# Patient Record
Sex: FEMALE | Race: BLACK OR AFRICAN AMERICAN | Employment: FULL TIME | URBAN - METROPOLITAN AREA
[De-identification: names, ages, dates, MRNs, and addresses within clinical notes are randomized per-mention and may not be internally consistent; named-entity substitution may affect disease eponyms.]

---

## 2019-06-04 ENCOUNTER — OFFICE VISIT (OUTPATIENT)
Dept: INTERNAL MEDICINE CLINIC | Age: 29
End: 2019-06-04

## 2019-06-04 ENCOUNTER — HOSPITAL ENCOUNTER (OUTPATIENT)
Dept: LAB | Age: 29
Discharge: HOME | End: 2019-06-04
Payer: COMMERCIAL

## 2019-06-04 VITALS
WEIGHT: 140 LBS | HEART RATE: 82 BPM | SYSTOLIC BLOOD PRESSURE: 107 MMHG | HEIGHT: 63 IN | RESPIRATION RATE: 18 BRPM | BODY MASS INDEX: 24.8 KG/M2 | OXYGEN SATURATION: 98 % | TEMPERATURE: 98.8 F | DIASTOLIC BLOOD PRESSURE: 66 MMHG

## 2019-06-04 DIAGNOSIS — L65.9 ALOPECIA: ICD-10-CM

## 2019-06-04 DIAGNOSIS — Z00.00 ENCOUNTER FOR PREVENTIVE HEALTH EXAMINATION: ICD-10-CM

## 2019-06-04 DIAGNOSIS — J30.1 SEASONAL ALLERGIC RHINITIS DUE TO POLLEN: ICD-10-CM

## 2019-06-04 DIAGNOSIS — B35.1 ONYCHOMYCOSIS: ICD-10-CM

## 2019-06-04 DIAGNOSIS — N92.0 MENORRHAGIA WITH REGULAR CYCLE: ICD-10-CM

## 2019-06-04 DIAGNOSIS — Z00.00 ENCOUNTER FOR PREVENTIVE HEALTH EXAMINATION: Primary | ICD-10-CM

## 2019-06-04 DIAGNOSIS — D50.0 IRON DEFICIENCY ANEMIA DUE TO CHRONIC BLOOD LOSS: ICD-10-CM

## 2019-06-04 LAB
ALBUMIN SERPL-MCNC: 3.7 G/DL (ref 3.4–5)
ALBUMIN/GLOB SERPL: 1.1 {RATIO} (ref 0.8–1.7)
ALP SERPL-CCNC: 62 U/L (ref 45–117)
ALT SERPL-CCNC: 21 U/L (ref 13–56)
ANION GAP SERPL CALC-SCNC: 7 MMOL/L (ref 3–18)
APPEARANCE UR: CLEAR
AST SERPL-CCNC: 18 U/L (ref 15–37)
BACTERIA URNS QL MICRO: NEGATIVE /HPF
BASOPHILS # BLD: 0 K/UL (ref 0–0.1)
BASOPHILS NFR BLD: 0 % (ref 0–2)
BILIRUB SERPL-MCNC: 1.1 MG/DL (ref 0.2–1)
BILIRUB UR QL: NEGATIVE
BUN SERPL-MCNC: 11 MG/DL (ref 7–18)
BUN/CREAT SERPL: 16 (ref 12–20)
CALCIUM SERPL-MCNC: 8.3 MG/DL (ref 8.5–10.1)
CHLORIDE SERPL-SCNC: 106 MMOL/L (ref 100–108)
CO2 SERPL-SCNC: 26 MMOL/L (ref 21–32)
COLOR UR: YELLOW
CREAT SERPL-MCNC: 0.67 MG/DL (ref 0.6–1.3)
DIFFERENTIAL METHOD BLD: ABNORMAL
EOSINOPHIL # BLD: 0.1 K/UL (ref 0–0.4)
EOSINOPHIL NFR BLD: 1 % (ref 0–5)
EPITH CASTS URNS QL MICRO: ABNORMAL /LPF (ref 0–5)
ERYTHROCYTE [DISTWIDTH] IN BLOOD BY AUTOMATED COUNT: 13 % (ref 11.6–14.5)
GLOBULIN SER CALC-MCNC: 3.5 G/DL (ref 2–4)
GLUCOSE SERPL-MCNC: 72 MG/DL (ref 74–99)
GLUCOSE UR STRIP.AUTO-MCNC: NEGATIVE MG/DL
HCT VFR BLD AUTO: 37 % (ref 35–45)
HGB BLD-MCNC: 11.9 G/DL (ref 12–16)
HGB UR QL STRIP: NEGATIVE
KETONES UR QL STRIP.AUTO: NEGATIVE MG/DL
LEUKOCYTE ESTERASE UR QL STRIP.AUTO: ABNORMAL
LYMPHOCYTES # BLD: 3.2 K/UL (ref 0.9–3.6)
LYMPHOCYTES NFR BLD: 46 % (ref 21–52)
MCH RBC QN AUTO: 28.7 PG (ref 24–34)
MCHC RBC AUTO-ENTMCNC: 32.2 G/DL (ref 31–37)
MCV RBC AUTO: 89.2 FL (ref 74–97)
MONOCYTES # BLD: 0.5 K/UL (ref 0.05–1.2)
MONOCYTES NFR BLD: 7 % (ref 3–10)
NEUTS SEG # BLD: 3.2 K/UL (ref 1.8–8)
NEUTS SEG NFR BLD: 46 % (ref 40–73)
NITRITE UR QL STRIP.AUTO: NEGATIVE
PH UR STRIP: 6.5 [PH] (ref 5–8)
PLATELET # BLD AUTO: 278 K/UL (ref 135–420)
PMV BLD AUTO: 11 FL (ref 9.2–11.8)
POTASSIUM SERPL-SCNC: 3.9 MMOL/L (ref 3.5–5.5)
PROT SERPL-MCNC: 7.2 G/DL (ref 6.4–8.2)
PROT UR STRIP-MCNC: NEGATIVE MG/DL
RBC # BLD AUTO: 4.15 M/UL (ref 4.2–5.3)
RBC #/AREA URNS HPF: 0 /HPF (ref 0–5)
SODIUM SERPL-SCNC: 139 MMOL/L (ref 136–145)
SP GR UR REFRACTOMETRY: 1.02 (ref 1–1.03)
TSH SERPL DL<=0.05 MIU/L-ACNC: 0.89 UIU/ML (ref 0.36–3.74)
UROBILINOGEN UR QL STRIP.AUTO: 1 EU/DL (ref 0.2–1)
VIT B12 SERPL-MCNC: 362 PG/ML (ref 211–911)
WBC # BLD AUTO: 6.9 K/UL (ref 4.6–13.2)
WBC URNS QL MICRO: ABNORMAL /HPF (ref 0–4)

## 2019-06-04 PROCEDURE — 36415 COLL VENOUS BLD VENIPUNCTURE: CPT

## 2019-06-04 PROCEDURE — 80053 COMPREHEN METABOLIC PANEL: CPT

## 2019-06-04 PROCEDURE — 86038 ANTINUCLEAR ANTIBODIES: CPT

## 2019-06-04 PROCEDURE — 82607 VITAMIN B-12: CPT

## 2019-06-04 PROCEDURE — 81001 URINALYSIS AUTO W/SCOPE: CPT

## 2019-06-04 PROCEDURE — 85025 COMPLETE CBC W/AUTO DIFF WBC: CPT

## 2019-06-04 PROCEDURE — 84443 ASSAY THYROID STIM HORMONE: CPT

## 2019-06-04 RX ORDER — ACETAMINOPHEN AND CODEINE PHOSPHATE 120; 12 MG/5ML; MG/5ML
SOLUTION ORAL
COMMUNITY
End: 2019-06-04 | Stop reason: SDUPTHER

## 2019-06-04 RX ORDER — ACETAMINOPHEN AND CODEINE PHOSPHATE 120; 12 MG/5ML; MG/5ML
1 SOLUTION ORAL DAILY
Qty: 3 DOSE PACK | Refills: 3 | Status: SHIPPED | OUTPATIENT
Start: 2019-06-04 | End: 2019-07-31

## 2019-06-04 NOTE — PROGRESS NOTES
Chief Complaint   Patient presents with    Physical     1. Have you been to the ER, urgent care clinic since your last visit? Hospitalized since your last visit? No    2. Have you seen or consulted any other health care providers outside of the [address] [place] [address] since your last visit? Include any pap smears or colon screening.  No

## 2019-06-04 NOTE — PATIENT INSTRUCTIONS
Assess overall health status (check labs)    Dermatology:  Onychomycosis    Alopecia>>Check B12, KRISTY (Lupus) and Thyroid    Anemia>>check CBC and iron level    Heavy periods>>OCP prescribed    Tight Hamstrings:   Stretch

## 2019-06-04 NOTE — PROGRESS NOTES
HPI:     This is the daughter of Geovanna Porter who presents to the office for wellness visit. She did not bring any records for review. She is currently working at the [employer] but is trying to sign up for the [employer]. She is under stress as she has three children at home. She and her  have one car and she has to get up at 0300 to drive him to work then she has to get home to get the children prepared for school. She is usually exhausted by the end of the day. She reports being treated for onychomycosis with what sounds like Lamisil but she did not get the desired effect and wanted to be referred out. She has been seen by a dermatologist in [place] for what sounds like cradle cap and provided Clobetasol solution with improvement. She also has noticed thinning of her hair and wanted to know what can be done to reverse this. She has history JAUN but is not taking supplements. She has menorrhagia and has been on OCP to control this. She denies any history of fibroids. She had one pregnancy delivered via C/S because of fetal deceleration with 2 others delivered transvaginally. She denied any problems with pre-eclampsia, gestational diabetes or other complications. History reviewed. No pertinent past medical history. Past Surgical History:   Procedure Laterality Date    HX  SECTION      x 1    HX GYN       Current Outpatient Medications   Medication Sig    norethindrone (MICRONOR) 0.35 mg tab Take 1 Tab by mouth daily. No current facility-administered medications for this visit. Allergies and Intolerances: Allergies   Allergen Reactions    Sulfadiazine Hives     Family History:   Family History   Problem Relation Age of Onset    Diabetes Mother     Hypertension Mother     No Known Problems Father     No Known Problems Sister     No Known Problems Sister     Endometriosis Sister      Social History:   She  reports that she has never smoked.  She has never used smokeless tobacco.   Social History     Substance and Sexual Activity   Alcohol Use Yes    Comment: occass     Immunization History:      Avoids flu    Diet:                                 Regular        Exercise:                         None    Screeningt:                      GYN 2018    Occupational Risk:          No hazards    Review of Systems   Constitutional: Negative for chills, fever and weight loss. HENT: Negative for hearing loss. Eyes: Negative for blurred vision. Respiratory: Negative for shortness of breath and wheezing. Cardiovascular: Negative for chest pain. Gastrointestinal: Negative for heartburn. Genitourinary: Negative for dysuria and urgency. Musculoskeletal: Positive for back pain. Skin: Negative for rash. Neurological: Negative for dizziness. Endo/Heme/Allergies: Positive for environmental allergies. Psychiatric/Behavioral: Negative for depression. The patient has insomnia. Physical:   Visit Vitals  /66 (BP 1 Location: Left arm, BP Patient Position: Sitting)   Pulse 82   Temp 98.8 °F (37.1 °C) (Oral)   Resp 18   Ht 5' 3\" (1.6 m)   Wt 140 lb (63.5 kg)   LMP 05/03/2019   SpO2 98%   BMI 24.80 kg/m²          Physical Exam   Constitutional: She is well-developed, well-nourished, and in no distress. HENT:   Head: Normocephalic and atraumatic. Right Ear: External ear normal.   Left Ear: External ear normal.   Nose:       Mouth/Throat: Oropharynx is clear and moist.   Eyes: Conjunctivae are normal. No scleral icterus. Neck: No JVD present. Cardiovascular: Normal rate, regular rhythm and normal heart sounds. Exam reveals no gallop. No murmur heard. Pulmonary/Chest: Breath sounds normal. She has no wheezes. She has no rales. Abdominal: Soft. Bowel sounds are normal. She exhibits no mass. There is no tenderness. Musculoskeletal: She exhibits no edema.    Tight hamstrings, negative SLR to 30 degrees, no LS spine tenderness, no peripheral synovitis Lymphadenopathy:     She has no cervical adenopathy. Skin: No cyanosis. Nails show no clubbing. Clear scalp, shaved at side, nail polish obscures nail details, tattoos   Vitals reviewed. Review of Data:  Labs:  No visits with results within 3 Month(s) from this visit. Latest known visit with results is:   No results found for any previous visit.              Health Maintenance Due   Topic Date Due    DTaP/Tdap/Td series (1 - Tdap) 01/08/2012    PAP AKA CERVICAL CYTOLOGY  01/08/2012          Impression/Plan:   Patient Active Problem List   Diagnosis  Code    Encounter for preventive health examination nonfasting labs ordered, confirm immunizations, adequate rest, healthy diet and regular exercise (stretch, walk)   Z00.00    Menorrhagia with regular cycle Referral to Dr. Estuardo Juarez, refill OCP pending appointment with her, repeat CBC   N92.0    Alopecia +/- seborrheic dermatitis, improved Obtain dermatology consultation notes   L65.9    Onychomycosis Referral to dermatology for nail clipping culture   B35.1    Seasonal allergic rhinitis due to pollen Flonase prn   J30.1    Iron deficiency anemia due to chronic blood loss Resume iron replacement, repeat CBC D50.0       Elma Narayanan MD

## 2019-06-06 DIAGNOSIS — Z00.00 ENCOUNTER FOR PREVENTIVE HEALTH EXAMINATION: Primary | ICD-10-CM

## 2019-06-06 LAB — ANA TITR SER IF: NEGATIVE {TITER}

## 2019-07-31 ENCOUNTER — APPOINTMENT (OUTPATIENT)
Dept: ULTRASOUND IMAGING | Age: 29
End: 2019-07-31
Attending: PHYSICIAN ASSISTANT
Payer: COMMERCIAL

## 2019-07-31 ENCOUNTER — HOSPITAL ENCOUNTER (EMERGENCY)
Age: 29
Discharge: HOME | End: 2019-08-01
Attending: EMERGENCY MEDICINE
Payer: COMMERCIAL

## 2019-07-31 VITALS
SYSTOLIC BLOOD PRESSURE: 111 MMHG | WEIGHT: 140 LBS | DIASTOLIC BLOOD PRESSURE: 69 MMHG | HEIGHT: 63 IN | TEMPERATURE: 99 F | OXYGEN SATURATION: 99 % | BODY MASS INDEX: 24.8 KG/M2 | RESPIRATION RATE: 16 BRPM | HEART RATE: 84 BPM

## 2019-07-31 DIAGNOSIS — Z32.01 PREGNANCY TEST POSITIVE: Primary | ICD-10-CM

## 2019-07-31 DIAGNOSIS — O23.41 URINARY TRACT INFECTION IN MOTHER DURING FIRST TRIMESTER OF PREGNANCY: ICD-10-CM

## 2019-07-31 LAB
ALBUMIN SERPL-MCNC: 3.4 G/DL (ref 3.4–5)
ALBUMIN/GLOB SERPL: 0.9 {RATIO} (ref 0.8–1.7)
ALP SERPL-CCNC: 53 U/L (ref 45–117)
ALT SERPL-CCNC: 19 U/L (ref 13–56)
ANION GAP SERPL CALC-SCNC: 6 MMOL/L (ref 3–18)
APPEARANCE UR: ABNORMAL
AST SERPL-CCNC: 16 U/L (ref 10–38)
BACTERIA URNS QL MICRO: ABNORMAL /HPF
BASOPHILS # BLD: 0 K/UL (ref 0–0.1)
BASOPHILS NFR BLD: 0 % (ref 0–2)
BILIRUB SERPL-MCNC: 0.5 MG/DL (ref 0.2–1)
BILIRUB UR QL: NEGATIVE
BUN SERPL-MCNC: 12 MG/DL (ref 7–18)
BUN/CREAT SERPL: 15 (ref 12–20)
CALCIUM SERPL-MCNC: 8.6 MG/DL (ref 8.5–10.1)
CHLORIDE SERPL-SCNC: 105 MMOL/L (ref 100–111)
CO2 SERPL-SCNC: 27 MMOL/L (ref 21–32)
COLOR UR: YELLOW
CREAT SERPL-MCNC: 0.79 MG/DL (ref 0.6–1.3)
DIFFERENTIAL METHOD BLD: ABNORMAL
EOSINOPHIL # BLD: 0.3 K/UL (ref 0–0.4)
EOSINOPHIL NFR BLD: 3 % (ref 0–5)
EPITH CASTS URNS QL MICRO: ABNORMAL /LPF (ref 0–5)
ERYTHROCYTE [DISTWIDTH] IN BLOOD BY AUTOMATED COUNT: 13.1 % (ref 11.6–14.5)
GLOBULIN SER CALC-MCNC: 3.7 G/DL (ref 2–4)
GLUCOSE SERPL-MCNC: 87 MG/DL (ref 74–99)
GLUCOSE UR STRIP.AUTO-MCNC: NEGATIVE MG/DL
HCG SERPL-ACNC: ABNORMAL MIU/ML (ref 0–10)
HCG UR QL: POSITIVE
HCT VFR BLD AUTO: 32.7 % (ref 35–45)
HGB BLD-MCNC: 11 G/DL (ref 12–16)
HGB UR QL STRIP: NEGATIVE
KETONES UR QL STRIP.AUTO: NEGATIVE MG/DL
LEUKOCYTE ESTERASE UR QL STRIP.AUTO: ABNORMAL
LYMPHOCYTES # BLD: 3.5 K/UL (ref 0.9–3.6)
LYMPHOCYTES NFR BLD: 38 % (ref 21–52)
MCH RBC QN AUTO: 29.9 PG (ref 24–34)
MCHC RBC AUTO-ENTMCNC: 33.6 G/DL (ref 31–37)
MCV RBC AUTO: 88.9 FL (ref 74–97)
MONOCYTES # BLD: 0.5 K/UL (ref 0.05–1.2)
MONOCYTES NFR BLD: 6 % (ref 3–10)
NEUTS SEG # BLD: 4.9 K/UL (ref 1.8–8)
NEUTS SEG NFR BLD: 53 % (ref 40–73)
NITRITE UR QL STRIP.AUTO: NEGATIVE
PH UR STRIP: 6 [PH] (ref 5–8)
PLATELET # BLD AUTO: 218 K/UL (ref 135–420)
PMV BLD AUTO: 9.6 FL (ref 9.2–11.8)
POTASSIUM SERPL-SCNC: 3.4 MMOL/L (ref 3.5–5.5)
PROT SERPL-MCNC: 7.1 G/DL (ref 6.4–8.2)
PROT UR STRIP-MCNC: NEGATIVE MG/DL
RBC # BLD AUTO: 3.68 M/UL (ref 4.2–5.3)
RBC #/AREA URNS HPF: ABNORMAL /HPF (ref 0–5)
SERVICE CMNT-IMP: NORMAL
SODIUM SERPL-SCNC: 138 MMOL/L (ref 136–145)
SP GR UR REFRACTOMETRY: 1.02 (ref 1–1.03)
UROBILINOGEN UR QL STRIP.AUTO: 1 EU/DL (ref 0.2–1)
WBC # BLD AUTO: 9.2 K/UL (ref 4.6–13.2)
WBC URNS QL MICRO: ABNORMAL /HPF (ref 0–4)
WET PREP GENITAL: NORMAL

## 2019-07-31 PROCEDURE — 87491 CHLMYD TRACH DNA AMP PROBE: CPT

## 2019-07-31 PROCEDURE — 81025 URINE PREGNANCY TEST: CPT

## 2019-07-31 PROCEDURE — 81001 URINALYSIS AUTO W/SCOPE: CPT

## 2019-07-31 PROCEDURE — 85025 COMPLETE CBC W/AUTO DIFF WBC: CPT

## 2019-07-31 PROCEDURE — 99283 EMERGENCY DEPT VISIT LOW MDM: CPT

## 2019-07-31 PROCEDURE — 84702 CHORIONIC GONADOTROPIN TEST: CPT

## 2019-07-31 PROCEDURE — 74011250637 HC RX REV CODE- 250/637: Performed by: EMERGENCY MEDICINE

## 2019-07-31 PROCEDURE — 76817 TRANSVAGINAL US OBSTETRIC: CPT

## 2019-07-31 PROCEDURE — 87210 SMEAR WET MOUNT SALINE/INK: CPT

## 2019-07-31 PROCEDURE — 80053 COMPREHEN METABOLIC PANEL: CPT

## 2019-07-31 RX ORDER — METOCLOPRAMIDE 10 MG/1
10 TABLET ORAL
Qty: 12 TAB | Refills: 0 | Status: SHIPPED | OUTPATIENT
Start: 2019-07-31 | End: 2019-08-10

## 2019-07-31 RX ORDER — CEPHALEXIN 500 MG/1
500 CAPSULE ORAL 2 TIMES DAILY
Qty: 14 CAP | Refills: 0 | Status: SHIPPED | OUTPATIENT
Start: 2019-07-31 | End: 2019-08-07

## 2019-07-31 RX ORDER — CEPHALEXIN 250 MG/1
500 CAPSULE ORAL
Status: COMPLETED | OUTPATIENT
Start: 2019-07-31 | End: 2019-07-31

## 2019-07-31 RX ADMIN — CEPHALEXIN 500 MG: 250 CAPSULE ORAL at 23:42

## 2019-07-31 RX ADMIN — POTASSIUM BICARBONATE 25 MEQ: 25 TABLET, EFFERVESCENT ORAL at 23:30

## 2019-08-01 NOTE — ED TRIAGE NOTES
Patient states that she is pregnant. She states pregnancy positive per home pregnancy test.  C/o pelvic pain and vaginal discharge.

## 2019-08-01 NOTE — ED PROVIDER NOTES
EMERGENCY DEPARTMENT HISTORY AND PHYSICAL EXAM    Date: 2019  Patient Name: Sofia Ibanez    History of Presenting Illness     Chief Complaint   Patient presents with    Pelvic Pain    Vaginal Discharge    Pregnancy Problem         History Provided By: Patient    Chief Complaint: pelvic pain  Duration: 2 Days  Timing:  Acute  Location: bilat, R > L  Quality: Cramping  Severity: 6 out of 10  Modifying Factors: none  Associated Symptoms: white vaginal discharge      Additional History (Context): Sofia Ibanez is a 30 y.o. female with No significant past medical history who presents with pelvic pain and vaginal discharge x 2 days. States she is approx 5 weeks pregnant by dates. LMP . Did a home pregnancy test which was positive. Denies vaginal bleeding/spotting, fever, chills, or any other complaints. Pt reports nausea, but not presently. Pt is [id number]    PCP: None   Previous OBGYN: Seb ALEXANDRA        Past History     Past Medical History:  History reviewed. No pertinent past medical history. Past Surgical History:  Past Surgical History:   Procedure Laterality Date    HX  SECTION      x 1    HX GYN         Family History:  Family History   Problem Relation Age of Onset    Diabetes Mother     Hypertension Mother     No Known Problems Father     No Known Problems Sister     No Known Problems Sister     Endometriosis Sister        Social History:  Social History     Tobacco Use    Smoking status: Never Smoker    Smokeless tobacco: Never Used   Substance Use Topics    Alcohol use: Yes     Comment: occass    Drug use: Never       Allergies: Allergies   Allergen Reactions    Sulfa (Sulfonamide Antibiotics) Hives    Sulfadiazine Hives         Review of Systems   Review of Systems   Constitutional: Negative for chills and fever. HENT: Negative. Respiratory: Negative. Cardiovascular: Negative. Gastrointestinal: Positive for nausea.    Genitourinary: Positive for pelvic pain and vaginal discharge. All other systems reviewed and are negative. All Other Systems Negative  Physical Exam     Vitals:    07/31/19 2047   BP: 111/69   Pulse: 84   Resp: 16   Temp: 99 °F (37.2 °C)   SpO2: 99%   Weight: 63.5 kg (140 lb)   Height: 5' 3\" (1.6 m)     Physical Exam   Constitutional: She is oriented to person, place, and time. She appears well-developed and well-nourished. No distress. HENT:   Head: Normocephalic and atraumatic. Mouth/Throat: Oropharynx is clear and moist.   Eyes: Conjunctivae are normal.   Cardiovascular: Normal rate. Pulmonary/Chest: Effort normal.   Abdominal: Soft. Bowel sounds are normal. She exhibits no distension. There is no tenderness. Musculoskeletal: Normal range of motion. Neurological: She is alert and oriented to person, place, and time. Skin: Skin is warm and dry. She is not diaphoretic. Psychiatric: She has a normal mood and affect.     Pt was directed to self swab prior to provider eval.    Diagnostic Study Results     Labs -     Recent Results (from the past 12 hour(s))   URINALYSIS W/ RFLX MICROSCOPIC    Collection Time: 07/31/19  8:50 PM   Result Value Ref Range    Color YELLOW      Appearance CLOUDY      Specific gravity 1.025 1.005 - 1.030      pH (UA) 6.0 5.0 - 8.0      Protein NEGATIVE  NEG mg/dL    Glucose NEGATIVE  NEG mg/dL    Ketone NEGATIVE  NEG mg/dL    Bilirubin NEGATIVE  NEG      Blood NEGATIVE  NEG      Urobilinogen 1.0 0.2 - 1.0 EU/dL    Nitrites NEGATIVE  NEG      Leukocyte Esterase SMALL (A) NEG     HCG URINE, QL    Collection Time: 07/31/19  8:50 PM   Result Value Ref Range    HCG urine, QL POSITIVE (A) NEG     WET PREP    Collection Time: 07/31/19  8:50 PM   Result Value Ref Range    Special Requests: NO SPECIAL REQUESTS      Wet prep NO YEAST,TRICHOMONAS OR CLUE CELLS NOTED     URINE MICROSCOPIC ONLY    Collection Time: 07/31/19  8:50 PM   Result Value Ref Range    WBC 4 to 10 0 - 4 /hpf    RBC NONE 0 - 5 /hpf Epithelial cells 2+ 0 - 5 /lpf    Bacteria 1+ (A) NEG /hpf       Radiologic Studies -   US TRANSVAGINAL    (Results Pending)     CT Results  (Last 48 hours)    None        CXR Results  (Last 48 hours)    None            Medical Decision Making   I am the first provider for this patient. I reviewed the vital signs, available nursing notes, past medical history, past surgical history, family history and social history. Vital Signs-Reviewed the patient's vital signs. Pulse Oximetry Analysis - 99% on RA    Records Reviewed: Nursing Notes and Old Medical Records    Procedures:  Procedures    Provider Notes (Medical Decision Making):   Ddx: pregnancy (IUP, ectopic), miscarriage (threatened, inevitable), UTI, STI, yeast vaginitis, BV, vs other. Pt is approx 5 weeks pregnant by dates c/o pelvic pain and vaginal discharge. Given her hx of ectopic, will do work up and US to r/o ectopic. Abd is soft and non tender. ALAN Shelton 9:43 PM      MED RECONCILIATION:  No current facility-administered medications for this encounter. No current outpatient medications on file. Disposition:  9:43 PM : Pt care transferred to Dr. Dakota Bennett  ,ED provider. History of patient complaint(s), available diagnostic reports and current treatment plan has been discussed thoroughly. Bedside rounding on patient occured : no . Intended disposition of patient : TBD  Pending diagnostics reports and/or labs (please list): US, Labs      Follow-up Information    None         There are no discharge medications for this patient.         Diagnosis     Clinical Impression: Positive pregnancy test

## 2019-08-01 NOTE — ED NOTES
Patient instructed/educated on  proper way to collect self swab vaginal specimen collection, per protocol, to included that ED nurse will transfer into proper specimen tubes and label in front of patient. Patient informed that nurse will help if needed. Patient verbalized understanding of and states she feels comfortable in doing self swab procedure. Provider informed.

## 2019-08-02 LAB
C TRACH RRNA SPEC QL NAA+PROBE: NEGATIVE
N GONORRHOEA RRNA SPEC QL NAA+PROBE: NEGATIVE
SPECIMEN SOURCE: NORMAL

## 2019-09-03 ENCOUNTER — OFFICE VISIT (OUTPATIENT)
Dept: INTERNAL MEDICINE CLINIC | Age: 29
End: 2019-09-03

## 2019-09-03 VITALS
HEIGHT: 63 IN | BODY MASS INDEX: 25.16 KG/M2 | SYSTOLIC BLOOD PRESSURE: 111 MMHG | OXYGEN SATURATION: 97 % | HEART RATE: 95 BPM | TEMPERATURE: 98.9 F | WEIGHT: 142 LBS | RESPIRATION RATE: 18 BRPM | DIASTOLIC BLOOD PRESSURE: 74 MMHG

## 2019-09-03 DIAGNOSIS — J30.1 SEASONAL ALLERGIC RHINITIS DUE TO POLLEN: ICD-10-CM

## 2019-09-03 DIAGNOSIS — R05.9 COUGH: ICD-10-CM

## 2019-09-03 DIAGNOSIS — B35.1 ONYCHOMYCOSIS: ICD-10-CM

## 2019-09-03 DIAGNOSIS — D50.0 IRON DEFICIENCY ANEMIA DUE TO CHRONIC BLOOD LOSS: ICD-10-CM

## 2019-09-03 DIAGNOSIS — N92.0 MENORRHAGIA WITH REGULAR CYCLE: ICD-10-CM

## 2019-09-03 DIAGNOSIS — L65.9 ALOPECIA: Primary | ICD-10-CM

## 2019-09-03 PROBLEM — Z00.00 ENCOUNTER FOR PREVENTIVE HEALTH EXAMINATION: Status: RESOLVED | Noted: 2019-06-04 | Resolved: 2019-09-03

## 2019-09-03 RX ORDER — KETOCONAZOLE 20 MG/G
CREAM TOPICAL DAILY
Qty: 30 G | Refills: 0 | Status: SHIPPED | OUTPATIENT
Start: 2019-09-03

## 2019-09-03 NOTE — PROGRESS NOTES
Chief Complaint   Patient presents with    Cough     1. Have you been to the ER, urgent care clinic since your last visit? Hospitalized since your last visit? No    2. Have you seen or consulted any other health care providers outside of the [address] [place] [address] since your last visit? Include any pap smears or colon screening.  No

## 2019-09-03 NOTE — PATIENT INSTRUCTIONS
Cough likely allergies but will check chest xray    Alopecia>>Dr. Jamin Reyes DERMATOLOGY    Fungal infection foot>>Nizoral Cream    Low Potassium>>Multivitamin and eat more fruit

## 2019-09-03 NOTE — LETTER
9/3/2019 1:59 PM 
 
Ms. Keara Gibbons [id number] [address] [address] [place] [address] To Whom It May Concern: This letter serves a certification that Ms. Keara Gibbons is an individual with a psychological disability and can be considered for employment under the Schedule A hiring authority 5 , 3102 ( ). Thank you for your interest in considering this individual for employment. You may contact me at the office for any questions that may arise.  
 
 
 
Sincerely, 
 
 
Paul Abbott MD

## 2019-09-03 NOTE — PROGRESS NOTES
HPI:     This lady presents to the office with intermitted dry cough not accompanied by wheezing, dyspnea, fever or chills. This has actually abated by the time she got this appointment. She also had recent elective  and had subsequent follow up with GYN and was told that the [facility] is likely to remain elevated for another few weeks. I do not have the records for review to determine if contraception or sterilization options were discussed. She is under stress at work and wants to pursue other opportunities and wants a Schedule A letter because of depression so she can qualify for a federal sector job. She would like a referral to another dermatologist for her alopecia as the topical steroid prescribed just gave her \"breakout\". She also reports peeling skin along the soles and was previously treated for tinea pedis and onychomycosis. She also wants to get her breasts to enlarge and was wondering if she would benefit from hormone therapy like her friend who is transitioning. Past Medical History:   Diagnosis Date    Allergic rhinitis     Alopecia 2019    Encounter for preventive health examination 2019    Iron deficiency anemia due to chronic blood loss 2019    Onychomycosis 2019    Seasonal allergic rhinitis due to pollen 2019     Past Surgical History:   Procedure Laterality Date    HX  SECTION      x 1    HX GYN       Current Outpatient Medications   Medication Sig    ketoconazole (NIZORAL) 2 % topical cream Apply  to affected area daily.  prenatal vit 16-DBWQ fum-folic 27 mg iron- 1 mg tab Take 1 Tab by mouth daily. No current facility-administered medications for this visit. Allergies and Intolerances:    Allergies   Allergen Reactions    Sulfa (Sulfonamide Antibiotics) Hives    Sulfadiazine Hives     Family History:   Family History   Problem Relation Age of Onset    Diabetes Mother     Hypertension Mother     No Known Problems Father    Opal Shah No Known Problems Sister     No Known Problems Sister     Endometriosis Sister      Social History:   She  reports that she has never smoked. She has never used smokeless tobacco.   Social History     Substance and Sexual Activity   Alcohol Use Yes    Comment: occass       Review of Systems   Constitutional: Negative for fever. Respiratory: Positive for cough. Negative for shortness of breath. Cardiovascular: Negative for chest pain and palpitations. Gastrointestinal: Negative for heartburn. Skin: Positive for rash. Neurological: Negative for dizziness and headaches. Psychiatric/Behavioral: Negative for depression. Physical:   Visit Vitals  /74   Pulse 95   Temp 98.9 °F (37.2 °C) (Oral)   Resp 18   Ht 5' 3\" (1.6 m)   Wt 142 lb (64.4 kg)   SpO2 97%   BMI 25.15 kg/m²          Physical Exam   Constitutional: She is well-developed, well-nourished, and in no distress. HENT:   Right Ear: External ear normal.   Left Ear: External ear normal.   Mouth/Throat: Oropharynx is clear and moist.   Eyes: Conjunctivae are normal. No scleral icterus. Neck: No JVD present. Cardiovascular: Normal rate, regular rhythm, normal heart sounds and intact distal pulses. Pulmonary/Chest: Breath sounds normal.   Abdominal: Soft. Bowel sounds are normal.   Musculoskeletal: She exhibits no edema. Lymphadenopathy:     She has no cervical adenopathy.    Skin:             Review of Data:  Labs:  Admission on 07/31/2019, Discharged on 08/01/2019   Component Date Value Ref Range Status    Color 07/31/2019 YELLOW    Final    Appearance 07/31/2019 CLOUDY    Final    Specific gravity 07/31/2019 1.025  1.005 - 1.030   Final    pH (UA) 07/31/2019 6.0  5.0 - 8.0   Final    Protein 07/31/2019 NEGATIVE   NEG mg/dL Final    Glucose 07/31/2019 NEGATIVE   NEG mg/dL Final    Ketone 07/31/2019 NEGATIVE   NEG mg/dL Final    Bilirubin 07/31/2019 NEGATIVE   NEG   Final    Blood 07/31/2019 NEGATIVE   NEG   Final    Urobilinogen 07/31/2019 1.0  0.2 - 1.0 EU/dL Final    Nitrites 07/31/2019 NEGATIVE   NEG   Final    Leukocyte Esterase 07/31/2019 SMALL* NEG   Final    HCG urine, QL 07/31/2019 POSITIVE* NEG   Final    Chlamydia amplification 07/31/2019 NEGATIVE   NEG   Final    N. gonorrhoeae amplification 07/31/2019 NEGATIVE   NEG   Final    Specimen Source 07/31/2019 UROGENITAL SWAB    Final    Special Requests: 07/31/2019 NO SPECIAL REQUESTS    Final    Wet prep 07/31/2019 NO YEAST,TRICHOMONAS OR CLUE CELLS NOTED    Final    WBC 07/31/2019 4 to 10  0 - 4 /hpf Final    RBC 07/31/2019 NONE  0 - 5 /hpf Final    Epithelial cells 07/31/2019 2+  0 - 5 /lpf Final    Bacteria 07/31/2019 1+* NEG /hpf Final    Beta HCG, QT 07/31/2019 35,973* 0 - 10 MIU/ML Final    WBC 07/31/2019 9.2  4.6 - 13.2 K/uL Final    RBC 07/31/2019 3.68* 4.20 - 5.30 M/uL Final    HGB 07/31/2019 11.0* 12.0 - 16.0 g/dL Final    HCT 07/31/2019 32.7* 35.0 - 45.0 % Final    MCV 07/31/2019 88.9  74.0 - 97.0 FL Final    MCH 07/31/2019 29.9  24.0 - 34.0 PG Final    MCHC 07/31/2019 33.6  31.0 - 37.0 g/dL Final    RDW 07/31/2019 13.1  11.6 - 14.5 % Final    PLATELET 94/53/7046 691  135 - 420 K/uL Final    MPV 07/31/2019 9.6  9.2 - 11.8 FL Final    NEUTROPHILS 07/31/2019 53  40 - 73 % Final    LYMPHOCYTES 07/31/2019 38  21 - 52 % Final    MONOCYTES 07/31/2019 6  3 - 10 % Final    EOSINOPHILS 07/31/2019 3  0 - 5 % Final    BASOPHILS 07/31/2019 0  0 - 2 % Final    ABS. NEUTROPHILS 07/31/2019 4.9  1.8 - 8.0 K/UL Final    ABS. LYMPHOCYTES 07/31/2019 3.5  0.9 - 3.6 K/UL Final    ABS. MONOCYTES 07/31/2019 0.5  0.05 - 1.2 K/UL Final    ABS. EOSINOPHILS 07/31/2019 0.3  0.0 - 0.4 K/UL Final    ABS.  BASOPHILS 07/31/2019 0.0  0.0 - 0.1 K/UL Final    DF 07/31/2019 AUTOMATED    Final    Sodium 07/31/2019 138  136 - 145 mmol/L Final    Potassium 07/31/2019 3.4* 3.5 - 5.5 mmol/L Final    Chloride 07/31/2019 105  100 - 111 mmol/L Final    CO2 07/31/2019 27  21 - 32 mmol/L Final    Anion gap 07/31/2019 6  3.0 - 18 mmol/L Final    Glucose 07/31/2019 87  74 - 99 mg/dL Final    BUN 07/31/2019 12  7.0 - 18 MG/DL Final    Creatinine 07/31/2019 0.79  0.6 - 1.3 MG/DL Final    BUN/Creatinine ratio 07/31/2019 15  12 - 20   Final    GFR est AA 07/31/2019 >60  >60 ml/min/1.73m2 Final    GFR est non-AA 07/31/2019 >60  >60 ml/min/1.73m2 Final    Calcium 07/31/2019 8.6  8.5 - 10.1 MG/DL Final    Bilirubin, total 07/31/2019 0.5  0.2 - 1.0 MG/DL Final    ALT (SGPT) 07/31/2019 19  13 - 56 U/L Final    AST (SGOT) 07/31/2019 16  10 - 38 U/L Final    Alk. phosphatase 07/31/2019 53  45 - 117 U/L Final    Protein, total 07/31/2019 7.1  6.4 - 8.2 g/dL Final    Albumin 07/31/2019 3.4  3.4 - 5.0 g/dL Final    Globulin 07/31/2019 3.7  2.0 - 4.0 g/dL Final    A-G Ratio 07/31/2019 0.9  0.8 - 1.7   Final   Hospital Outpatient Visit on 06/04/2019   Component Date Value Ref Range Status    Sodium 06/04/2019 139  136 - 145 mmol/L Final    Potassium 06/04/2019 3.9  3.5 - 5.5 mmol/L Final    Chloride 06/04/2019 106  100 - 108 mmol/L Final    CO2 06/04/2019 26  21 - 32 mmol/L Final    Anion gap 06/04/2019 7  3.0 - 18 mmol/L Final    Glucose 06/04/2019 72* 74 - 99 mg/dL Final    BUN 06/04/2019 11  7.0 - 18 MG/DL Final    Creatinine 06/04/2019 0.67  0.6 - 1.3 MG/DL Final    BUN/Creatinine ratio 06/04/2019 16  12 - 20   Final    GFR est AA 06/04/2019 >60  >60 ml/min/1.73m2 Final    GFR est non-AA 06/04/2019 >60  >60 ml/min/1.73m2 Final    Calcium 06/04/2019 8.3* 8.5 - 10.1 MG/DL Final    Bilirubin, total 06/04/2019 1.1* 0.2 - 1.0 MG/DL Final    ALT (SGPT) 06/04/2019 21  13 - 56 U/L Final    AST (SGOT) 06/04/2019 18  15 - 37 U/L Final    Alk.  phosphatase 06/04/2019 62  45 - 117 U/L Final    Protein, total 06/04/2019 7.2  6.4 - 8.2 g/dL Final    Albumin 06/04/2019 3.7  3.4 - 5.0 g/dL Final    Globulin 06/04/2019 3.5  2.0 - 4.0 g/dL Final    A-G Ratio 06/04/2019 1.1  0.8 - 1.7   Final    TSH 06/04/2019 0.89  0.36 - 3.74 uIU/mL Final    Color 06/04/2019 YELLOW    Final    Appearance 06/04/2019 CLEAR    Final    Specific gravity 06/04/2019 1.022  1.005 - 1.030   Final    pH (UA) 06/04/2019 6.5  5.0 - 8.0   Final    Protein 06/04/2019 NEGATIVE   NEG mg/dL Final    Glucose 06/04/2019 NEGATIVE   NEG mg/dL Final    Ketone 06/04/2019 NEGATIVE   NEG mg/dL Final    Bilirubin 06/04/2019 NEGATIVE   NEG   Final    Blood 06/04/2019 NEGATIVE   NEG   Final    Urobilinogen 06/04/2019 1.0  0.2 - 1.0 EU/dL Final    Nitrites 06/04/2019 NEGATIVE   NEG   Final    Leukocyte Esterase 06/04/2019 TRACE* NEG   Final    WBC 06/04/2019 1 to 2  0 - 4 /hpf Final    RBC 06/04/2019 0  0 - 5 /hpf Final    Epithelial cells 06/04/2019 FEW  0 - 5 /lpf Final    Bacteria 06/04/2019 NEGATIVE   NEG /hpf Final    WBC 06/04/2019 6.9  4.6 - 13.2 K/uL Final    RBC 06/04/2019 4.15* 4.20 - 5.30 M/uL Final    HGB 06/04/2019 11.9* 12.0 - 16.0 g/dL Final    HCT 06/04/2019 37.0  35.0 - 45.0 % Final    MCV 06/04/2019 89.2  74.0 - 97.0 FL Final    MCH 06/04/2019 28.7  24.0 - 34.0 PG Final    MCHC 06/04/2019 32.2  31.0 - 37.0 g/dL Final    RDW 06/04/2019 13.0  11.6 - 14.5 % Final    PLATELET 53/33/2756 861  135 - 420 K/uL Final    MPV 06/04/2019 11.0  9.2 - 11.8 FL Final    NEUTROPHILS 06/04/2019 46  40 - 73 % Final    LYMPHOCYTES 06/04/2019 46  21 - 52 % Final    MONOCYTES 06/04/2019 7  3 - 10 % Final    EOSINOPHILS 06/04/2019 1  0 - 5 % Final    BASOPHILS 06/04/2019 0  0 - 2 % Final    ABS. NEUTROPHILS 06/04/2019 3.2  1.8 - 8.0 K/UL Final    ABS. LYMPHOCYTES 06/04/2019 3.2  0.9 - 3.6 K/UL Final    ABS. MONOCYTES 06/04/2019 0.5  0.05 - 1.2 K/UL Final    ABS. EOSINOPHILS 06/04/2019 0.1  0.0 - 0.4 K/UL Final    ABS.  BASOPHILS 06/04/2019 0.0  0.0 - 0.1 K/UL Final    DF 06/04/2019 AUTOMATED    Final    Antinuclear Abs, IFA 06/04/2019 NEGATIVE     Final    Vitamin B12 06/04/2019 362  211 - 911 pg/mL Final         Health Maintenance Due   Topic Date Due    DTaP/Tdap/Td series (1 - Tdap) 01/08/2012    PAP AKA CERVICAL CYTOLOGY  01/08/2012    Influenza Age 5 to Adult  08/01/2019      Impression/Plan:   Patient Active Problem List   Diagnosis  Code    JUAN secondary to menorrhagia with regular cycle Repeat CBC and iron panel with next visit   D50.0    Alopecia Referral to Dr. Devlin [facility] for 2nd opinion   L65.9    Onychomycosis/Tinea Pedis Nizoral cream   B35.1    Seasonal allergic rhinitis due to pollen Claritin or Allegra prn   J30.1    Cough likely from post nasal drainage, resolved No further treatment indicated R05.1       Gifty Rothman MD

## 2020-02-17 ENCOUNTER — HOSPITAL ENCOUNTER (EMERGENCY)
Age: 30
Discharge: LEFT BEFORE DISCHARGE | End: 2020-02-17
Attending: EMERGENCY MEDICINE
Payer: SELF-PAY

## 2020-02-17 VITALS
DIASTOLIC BLOOD PRESSURE: 81 MMHG | BODY MASS INDEX: 24.8 KG/M2 | SYSTOLIC BLOOD PRESSURE: 117 MMHG | HEART RATE: 79 BPM | TEMPERATURE: 98.6 F | OXYGEN SATURATION: 100 % | WEIGHT: 140 LBS | RESPIRATION RATE: 18 BRPM | HEIGHT: 63 IN

## 2020-02-17 PROCEDURE — 75810000275 HC EMERGENCY DEPT VISIT NO LEVEL OF CARE

## 2020-02-18 NOTE — ED TRIAGE NOTES
Pt. Reports eating hambuger helper last night and states she feels like its stuck in the lower portion of her throat and neck. Pt states she took pain meds after to help it. Pt states it feels like something is stuck. Pt in NAD and is breathing without difficulty.

## 2020-02-18 NOTE — ED TRIAGE NOTES
Patient having a hard time swallowing today after eating yesterday. She feels like something is stuck in her esophagus. I performed a brief evaluation, including history and physical, of the patient here in triage and I have determined that pt will need further treatment and evaluation from the main side ER physician. I have placed initial orders to help in expediting patients care.      February 17, 2020 at 7:45 PM - Melvina Bronson PA-C        Visit Vitals  /81 (BP 1 Location: Left arm, BP Patient Position: At rest)   Pulse 79   Temp 98.6 °F (37 °C)   Resp 18   Ht 5' 3\" (1.6 m)   Wt 63.5 kg (140 lb)   SpO2 100%   BMI 24.80 kg/m²